# Patient Record
Sex: FEMALE | Race: WHITE | NOT HISPANIC OR LATINO
[De-identification: names, ages, dates, MRNs, and addresses within clinical notes are randomized per-mention and may not be internally consistent; named-entity substitution may affect disease eponyms.]

---

## 2017-01-27 ENCOUNTER — ASOB RESULT (OUTPATIENT)
Age: 27
End: 2017-01-27

## 2017-01-27 ENCOUNTER — APPOINTMENT (OUTPATIENT)
Dept: ANTEPARTUM | Facility: CLINIC | Age: 27
End: 2017-01-27

## 2017-06-22 ENCOUNTER — OUTPATIENT (OUTPATIENT)
Dept: OUTPATIENT SERVICES | Facility: HOSPITAL | Age: 27
LOS: 1 days | End: 2017-06-22
Payer: COMMERCIAL

## 2017-06-22 ENCOUNTER — ASOB RESULT (OUTPATIENT)
Age: 27
End: 2017-06-22

## 2017-06-22 ENCOUNTER — APPOINTMENT (OUTPATIENT)
Dept: ANTEPARTUM | Facility: CLINIC | Age: 27
End: 2017-06-22

## 2017-06-22 DIAGNOSIS — Z01.818 ENCOUNTER FOR OTHER PREPROCEDURAL EXAMINATION: ICD-10-CM

## 2017-06-22 PROCEDURE — 76818 FETAL BIOPHYS PROFILE W/NST: CPT

## 2017-06-22 PROCEDURE — 76816 OB US FOLLOW-UP PER FETUS: CPT

## 2017-06-23 DIAGNOSIS — O26.23 PREGNANCY CARE FOR PATIENT WITH RECURRENT PREGNANCY LOSS, THIRD TRIMESTER: ICD-10-CM

## 2017-06-23 DIAGNOSIS — O99.89 OTHER SPECIFIED DISEASES AND CONDITIONS COMPLICATING PREGNANCY, CHILDBIRTH AND THE PUERPERIUM: ICD-10-CM

## 2017-06-23 DIAGNOSIS — Z03.74 ENCOUNTER FOR SUSPECTED PROBLEM WITH FETAL GROWTH RULED OUT: ICD-10-CM

## 2017-06-23 DIAGNOSIS — O48.0 POST-TERM PREGNANCY: ICD-10-CM

## 2017-06-26 ENCOUNTER — APPOINTMENT (OUTPATIENT)
Dept: ANTEPARTUM | Facility: CLINIC | Age: 27
End: 2017-06-26

## 2017-06-26 ENCOUNTER — INPATIENT (INPATIENT)
Facility: HOSPITAL | Age: 27
LOS: 1 days | Discharge: HOME | End: 2017-06-28
Attending: OBSTETRICS & GYNECOLOGY | Admitting: OBSTETRICS & GYNECOLOGY
Payer: COMMERCIAL

## 2017-06-26 VITALS — WEIGHT: 160.94 LBS | HEIGHT: 69 IN

## 2017-06-26 DIAGNOSIS — Z34.80 ENCOUNTER FOR SUPERVISION OF OTHER NORMAL PREGNANCY, UNSPECIFIED TRIMESTER: ICD-10-CM

## 2017-06-26 DIAGNOSIS — O26.899 OTHER SPECIFIED PREGNANCY RELATED CONDITIONS, UNSPECIFIED TRIMESTER: ICD-10-CM

## 2017-06-26 DIAGNOSIS — Z3A.00 WEEKS OF GESTATION OF PREGNANCY NOT SPECIFIED: ICD-10-CM

## 2017-06-26 LAB
BASOPHILS # BLD AUTO: 0 K/UL — SIGNIFICANT CHANGE UP (ref 0–0.2)
BASOPHILS NFR BLD AUTO: 0.2 % — SIGNIFICANT CHANGE UP (ref 0–2)
BLD GP AB SCN SERPL QL: NEGATIVE — SIGNIFICANT CHANGE UP
EOSINOPHIL # BLD AUTO: 0.1 K/UL — SIGNIFICANT CHANGE UP (ref 0–0.5)
EOSINOPHIL NFR BLD AUTO: 0.8 % — SIGNIFICANT CHANGE UP (ref 0–6)
HCT VFR BLD CALC: 41 % — SIGNIFICANT CHANGE UP (ref 34.5–45)
HGB BLD-MCNC: 14.1 G/DL — SIGNIFICANT CHANGE UP (ref 11.5–15.5)
LYMPHOCYTES # BLD AUTO: 1.4 K/UL — SIGNIFICANT CHANGE UP (ref 1–3.3)
LYMPHOCYTES # BLD AUTO: 14.6 % — SIGNIFICANT CHANGE UP (ref 13–44)
MCHC RBC-ENTMCNC: 30.7 PG — SIGNIFICANT CHANGE UP (ref 27–34)
MCHC RBC-ENTMCNC: 34.4 GM/DL — SIGNIFICANT CHANGE UP (ref 32–36)
MCV RBC AUTO: 89 FL — SIGNIFICANT CHANGE UP (ref 80–100)
MONOCYTES # BLD AUTO: 0.5 K/UL — SIGNIFICANT CHANGE UP (ref 0–0.9)
MONOCYTES NFR BLD AUTO: 5.2 % — SIGNIFICANT CHANGE UP (ref 2–14)
NEUTROPHILS # BLD AUTO: 7.5 K/UL — HIGH (ref 1.8–7.4)
NEUTROPHILS NFR BLD AUTO: 79.1 % — HIGH (ref 43–77)
PLATELET # BLD AUTO: 189 K/UL — SIGNIFICANT CHANGE UP (ref 150–400)
RBC # BLD: 4.6 M/UL — SIGNIFICANT CHANGE UP (ref 3.8–5.2)
RBC # FLD: 12.1 % — SIGNIFICANT CHANGE UP (ref 10.3–14.5)
RH IG SCN BLD-IMP: POSITIVE — SIGNIFICANT CHANGE UP
WBC # BLD: 9.4 K/UL — SIGNIFICANT CHANGE UP (ref 3.8–10.5)
WBC # FLD AUTO: 9.4 K/UL — SIGNIFICANT CHANGE UP (ref 3.8–10.5)

## 2017-06-26 RX ORDER — DIPHENHYDRAMINE HCL 50 MG
25 CAPSULE ORAL EVERY 6 HOURS
Qty: 0 | Refills: 0 | Status: DISCONTINUED | OUTPATIENT
Start: 2017-06-26 | End: 2017-06-28

## 2017-06-26 RX ORDER — IBUPROFEN 200 MG
600 TABLET ORAL EVERY 6 HOURS
Qty: 0 | Refills: 0 | Status: COMPLETED | OUTPATIENT
Start: 2017-06-26 | End: 2018-05-25

## 2017-06-26 RX ORDER — PRAMOXINE HYDROCHLORIDE 150 MG/15G
1 AEROSOL, FOAM RECTAL EVERY 4 HOURS
Qty: 0 | Refills: 0 | Status: DISCONTINUED | OUTPATIENT
Start: 2017-06-26 | End: 2017-06-28

## 2017-06-26 RX ORDER — SODIUM CHLORIDE 9 MG/ML
1000 INJECTION, SOLUTION INTRAVENOUS ONCE
Qty: 0 | Refills: 0 | Status: COMPLETED | OUTPATIENT
Start: 2017-06-26 | End: 2017-06-26

## 2017-06-26 RX ORDER — DIBUCAINE 1 %
1 OINTMENT (GRAM) RECTAL EVERY 4 HOURS
Qty: 0 | Refills: 0 | Status: DISCONTINUED | OUTPATIENT
Start: 2017-06-26 | End: 2017-06-28

## 2017-06-26 RX ORDER — ACETAMINOPHEN 500 MG
975 TABLET ORAL EVERY 6 HOURS
Qty: 0 | Refills: 0 | Status: DISCONTINUED | OUTPATIENT
Start: 2017-06-26 | End: 2017-06-28

## 2017-06-26 RX ORDER — LANOLIN
1 OINTMENT (GRAM) TOPICAL EVERY 6 HOURS
Qty: 0 | Refills: 0 | Status: DISCONTINUED | OUTPATIENT
Start: 2017-06-26 | End: 2017-06-28

## 2017-06-26 RX ORDER — OXYCODONE HYDROCHLORIDE 5 MG/1
5 TABLET ORAL
Qty: 0 | Refills: 0 | Status: DISCONTINUED | OUTPATIENT
Start: 2017-06-26 | End: 2017-06-28

## 2017-06-26 RX ORDER — SODIUM CHLORIDE 9 MG/ML
1000 INJECTION, SOLUTION INTRAVENOUS
Qty: 0 | Refills: 0 | Status: DISCONTINUED | OUTPATIENT
Start: 2017-06-26 | End: 2017-06-26

## 2017-06-26 RX ORDER — KETOROLAC TROMETHAMINE 30 MG/ML
30 SYRINGE (ML) INJECTION ONCE
Qty: 0 | Refills: 0 | Status: DISCONTINUED | OUTPATIENT
Start: 2017-06-26 | End: 2017-06-26

## 2017-06-26 RX ORDER — IBUPROFEN 200 MG
600 TABLET ORAL EVERY 6 HOURS
Qty: 0 | Refills: 0 | Status: DISCONTINUED | OUTPATIENT
Start: 2017-06-26 | End: 2017-06-28

## 2017-06-26 RX ORDER — OXYCODONE HYDROCHLORIDE 5 MG/1
5 TABLET ORAL EVERY 4 HOURS
Qty: 0 | Refills: 0 | Status: DISCONTINUED | OUTPATIENT
Start: 2017-06-26 | End: 2017-06-28

## 2017-06-26 RX ORDER — TETANUS TOXOID, REDUCED DIPHTHERIA TOXOID AND ACELLULAR PERTUSSIS VACCINE, ADSORBED 5; 2.5; 8; 8; 2.5 [IU]/.5ML; [IU]/.5ML; UG/.5ML; UG/.5ML; UG/.5ML
0.5 SUSPENSION INTRAMUSCULAR ONCE
Qty: 0 | Refills: 0 | Status: DISCONTINUED | OUTPATIENT
Start: 2017-06-26 | End: 2017-06-28

## 2017-06-26 RX ORDER — OXYTOCIN 10 UNIT/ML
41.67 VIAL (ML) INJECTION
Qty: 20 | Refills: 0 | Status: DISCONTINUED | OUTPATIENT
Start: 2017-06-26 | End: 2017-06-28

## 2017-06-26 RX ORDER — SODIUM CHLORIDE 9 MG/ML
3 INJECTION INTRAMUSCULAR; INTRAVENOUS; SUBCUTANEOUS EVERY 8 HOURS
Qty: 0 | Refills: 0 | Status: DISCONTINUED | OUTPATIENT
Start: 2017-06-26 | End: 2017-06-26

## 2017-06-26 RX ORDER — DIBUCAINE 1 %
1 OINTMENT (GRAM) RECTAL EVERY 4 HOURS
Qty: 0 | Refills: 0 | Status: DISCONTINUED | OUTPATIENT
Start: 2017-06-26 | End: 2017-06-26

## 2017-06-26 RX ORDER — OXYTOCIN 10 UNIT/ML
41.67 VIAL (ML) INJECTION
Qty: 20 | Refills: 0 | Status: DISCONTINUED | OUTPATIENT
Start: 2017-06-26 | End: 2017-06-26

## 2017-06-26 RX ORDER — PRAMOXINE HYDROCHLORIDE 150 MG/15G
1 AEROSOL, FOAM RECTAL EVERY 4 HOURS
Qty: 0 | Refills: 0 | Status: DISCONTINUED | OUTPATIENT
Start: 2017-06-26 | End: 2017-06-26

## 2017-06-26 RX ORDER — SODIUM CHLORIDE 9 MG/ML
3 INJECTION INTRAMUSCULAR; INTRAVENOUS; SUBCUTANEOUS EVERY 8 HOURS
Qty: 0 | Refills: 0 | Status: DISCONTINUED | OUTPATIENT
Start: 2017-06-26 | End: 2017-06-28

## 2017-06-26 RX ORDER — HYDROCORTISONE 1 %
1 OINTMENT (GRAM) TOPICAL EVERY 4 HOURS
Qty: 0 | Refills: 0 | Status: DISCONTINUED | OUTPATIENT
Start: 2017-06-26 | End: 2017-06-28

## 2017-06-26 RX ORDER — OXYTOCIN 10 UNIT/ML
4 VIAL (ML) INJECTION
Qty: 30 | Refills: 0 | Status: DISCONTINUED | OUTPATIENT
Start: 2017-06-26 | End: 2017-06-26

## 2017-06-26 RX ORDER — CITRIC ACID/SODIUM CITRATE 300-500 MG
15 SOLUTION, ORAL ORAL EVERY 4 HOURS
Qty: 0 | Refills: 0 | Status: DISCONTINUED | OUTPATIENT
Start: 2017-06-26 | End: 2017-06-26

## 2017-06-26 RX ORDER — GLYCERIN ADULT
1 SUPPOSITORY, RECTAL RECTAL AT BEDTIME
Qty: 0 | Refills: 0 | Status: DISCONTINUED | OUTPATIENT
Start: 2017-06-26 | End: 2017-06-28

## 2017-06-26 RX ORDER — SIMETHICONE 80 MG/1
80 TABLET, CHEWABLE ORAL EVERY 6 HOURS
Qty: 0 | Refills: 0 | Status: DISCONTINUED | OUTPATIENT
Start: 2017-06-26 | End: 2017-06-28

## 2017-06-26 RX ORDER — AER TRAVELER 0.5 G/1
1 SOLUTION RECTAL; TOPICAL EVERY 4 HOURS
Qty: 0 | Refills: 0 | Status: DISCONTINUED | OUTPATIENT
Start: 2017-06-26 | End: 2017-06-28

## 2017-06-26 RX ORDER — MAGNESIUM HYDROXIDE 400 MG/1
30 TABLET, CHEWABLE ORAL
Qty: 0 | Refills: 0 | Status: DISCONTINUED | OUTPATIENT
Start: 2017-06-26 | End: 2017-06-28

## 2017-06-26 RX ORDER — OXYTOCIN 10 UNIT/ML
333.33 VIAL (ML) INJECTION
Qty: 20 | Refills: 0 | Status: DISCONTINUED | OUTPATIENT
Start: 2017-06-26 | End: 2017-06-26

## 2017-06-26 RX ORDER — DOCUSATE SODIUM 100 MG
100 CAPSULE ORAL
Qty: 0 | Refills: 0 | Status: DISCONTINUED | OUTPATIENT
Start: 2017-06-26 | End: 2017-06-28

## 2017-06-26 RX ORDER — AER TRAVELER 0.5 G/1
1 SOLUTION RECTAL; TOPICAL EVERY 4 HOURS
Qty: 0 | Refills: 0 | Status: DISCONTINUED | OUTPATIENT
Start: 2017-06-26 | End: 2017-06-26

## 2017-06-26 RX ORDER — HYDROCORTISONE 1 %
1 OINTMENT (GRAM) TOPICAL EVERY 4 HOURS
Qty: 0 | Refills: 0 | Status: DISCONTINUED | OUTPATIENT
Start: 2017-06-26 | End: 2017-06-26

## 2017-06-26 RX ORDER — ACETAMINOPHEN 500 MG
975 TABLET ORAL EVERY 6 HOURS
Qty: 0 | Refills: 0 | Status: COMPLETED | OUTPATIENT
Start: 2017-06-26 | End: 2018-05-25

## 2017-06-26 RX ADMIN — SODIUM CHLORIDE 2000 MILLILITER(S): 9 INJECTION, SOLUTION INTRAVENOUS at 16:23

## 2017-06-26 RX ADMIN — Medication 600 MILLIGRAM(S): at 22:03

## 2017-06-26 RX ADMIN — Medication 4 MILLIUNIT(S)/MIN: at 17:17

## 2017-06-26 RX ADMIN — Medication 600 MILLIGRAM(S): at 22:35

## 2017-06-27 LAB
HCT VFR BLD CALC: 30.9 % — LOW (ref 34.5–45)
HGB BLD-MCNC: 10.7 G/DL — LOW (ref 11.5–15.5)
T PALLIDUM AB TITR SER: NEGATIVE — SIGNIFICANT CHANGE UP

## 2017-06-27 RX ADMIN — Medication 975 MILLIGRAM(S): at 19:20

## 2017-06-27 RX ADMIN — Medication 975 MILLIGRAM(S): at 07:35

## 2017-06-27 RX ADMIN — Medication 975 MILLIGRAM(S): at 00:16

## 2017-06-27 RX ADMIN — Medication 600 MILLIGRAM(S): at 23:29

## 2017-06-27 RX ADMIN — Medication 600 MILLIGRAM(S): at 04:09

## 2017-06-27 RX ADMIN — Medication 975 MILLIGRAM(S): at 07:05

## 2017-06-27 RX ADMIN — Medication 600 MILLIGRAM(S): at 17:09

## 2017-06-27 RX ADMIN — Medication 975 MILLIGRAM(S): at 00:50

## 2017-06-27 RX ADMIN — Medication 600 MILLIGRAM(S): at 11:40

## 2017-06-27 RX ADMIN — Medication 975 MILLIGRAM(S): at 13:40

## 2017-06-27 RX ADMIN — Medication 600 MILLIGRAM(S): at 11:10

## 2017-06-27 RX ADMIN — Medication 975 MILLIGRAM(S): at 18:50

## 2017-06-27 RX ADMIN — Medication 600 MILLIGRAM(S): at 22:59

## 2017-06-27 RX ADMIN — Medication 600 MILLIGRAM(S): at 04:45

## 2017-06-27 RX ADMIN — Medication 100 MILLIGRAM(S): at 11:11

## 2017-06-27 RX ADMIN — Medication 600 MILLIGRAM(S): at 17:39

## 2017-06-27 RX ADMIN — Medication 975 MILLIGRAM(S): at 13:10

## 2017-06-27 RX ADMIN — Medication 1 TABLET(S): at 11:10

## 2017-06-27 NOTE — PROGRESS NOTE ADULT - SUBJECTIVE AND OBJECTIVE BOX
VS: Vital Signs Last 24 Hrs  T(C): 36.4 (27 Jun 2017 05:00), Max: 36.9 (26 Jun 2017 18:45)  T(F): 97.5 (27 Jun 2017 05:00), Max: 98.4 (26 Jun 2017 18:45)  HR: 62 (27 Jun 2017 05:00) (62 - 96)  BP: 96/64 (27 Jun 2017 05:00) (96/64 - 124/59)  BP(mean): --  RR: 18 (27 Jun 2017 05:00) (18 - 20)  SpO2: 97% (26 Jun 2017 21:30) (97% - 100%)    Abdomen soft, fundus firm  Lochia WNL  Voiding, stable

## 2017-06-28 VITALS
HEART RATE: 67 BPM | TEMPERATURE: 98 F | SYSTOLIC BLOOD PRESSURE: 95 MMHG | RESPIRATION RATE: 18 BRPM | DIASTOLIC BLOOD PRESSURE: 60 MMHG

## 2017-06-28 PROCEDURE — 59050 FETAL MONITOR W/REPORT: CPT

## 2017-06-28 PROCEDURE — 85018 HEMOGLOBIN: CPT

## 2017-06-28 PROCEDURE — 86901 BLOOD TYPING SEROLOGIC RH(D): CPT

## 2017-06-28 PROCEDURE — 86900 BLOOD TYPING SEROLOGIC ABO: CPT

## 2017-06-28 PROCEDURE — 85027 COMPLETE CBC AUTOMATED: CPT

## 2017-06-28 PROCEDURE — 86850 RBC ANTIBODY SCREEN: CPT

## 2017-06-28 PROCEDURE — G0463: CPT

## 2017-06-28 PROCEDURE — 59025 FETAL NON-STRESS TEST: CPT

## 2017-06-28 PROCEDURE — 86780 TREPONEMA PALLIDUM: CPT

## 2017-06-28 RX ADMIN — Medication 600 MILLIGRAM(S): at 11:44

## 2017-06-28 RX ADMIN — Medication 1 TABLET(S): at 10:56

## 2017-06-28 RX ADMIN — Medication 600 MILLIGRAM(S): at 05:21

## 2017-06-28 RX ADMIN — Medication 975 MILLIGRAM(S): at 08:13

## 2017-06-28 RX ADMIN — Medication 600 MILLIGRAM(S): at 10:56

## 2017-06-28 RX ADMIN — Medication 975 MILLIGRAM(S): at 02:44

## 2017-06-28 RX ADMIN — Medication 975 MILLIGRAM(S): at 02:14

## 2017-06-28 RX ADMIN — Medication 100 MILLIGRAM(S): at 10:56

## 2017-06-28 RX ADMIN — SIMETHICONE 80 MILLIGRAM(S): 80 TABLET, CHEWABLE ORAL at 10:58

## 2017-06-28 RX ADMIN — Medication 975 MILLIGRAM(S): at 08:54

## 2017-06-28 NOTE — DISCHARGE NOTE OB - PATIENT PORTAL LINK FT
“You can access the [facility] Patient Portal, offered by [facility], by registering with the following website: [email or web address]”

## 2017-06-28 NOTE — PROGRESS NOTE ADULT - SUBJECTIVE AND OBJECTIVE BOX
VS: Vital Signs Last 24 Hrs  T(C): 36.4 (28 Jun 2017 05:29), Max: 36.8 (27 Jun 2017 21:00)  T(F): 97.6 (28 Jun 2017 05:29), Max: 98.2 (27 Jun 2017 21:00)  HR: 67 (28 Jun 2017 05:29) (67 - 86)  BP: 95/60 (28 Jun 2017 05:29) (95/60 - 97/63)  BP(mean): --  RR: 18 (28 Jun 2017 05:29) (18 - 18)  SpO2: --    Abdomen soft, fundus firm  Lochia WNL  Voiding, stable

## 2017-06-28 NOTE — DISCHARGE NOTE OB - CARE PROVIDER_API CALL
Conchis Crowley), Obstetrics and Gynecology  [address]  [place] [address]  Phone: [phone]  Fax: [phone]

## 2018-04-30 ENCOUNTER — APPOINTMENT (OUTPATIENT)
Dept: ANTEPARTUM | Facility: CLINIC | Age: 28
End: 2018-04-30
Payer: MEDICAID

## 2018-04-30 ENCOUNTER — ASOB RESULT (OUTPATIENT)
Age: 28
End: 2018-04-30

## 2018-04-30 PROCEDURE — 76801 OB US < 14 WKS SINGLE FETUS: CPT

## 2018-12-03 ENCOUNTER — INPATIENT (INPATIENT)
Facility: HOSPITAL | Age: 28
LOS: 2 days | Discharge: HOME | End: 2018-12-06
Attending: SPECIALIST | Admitting: SPECIALIST

## 2018-12-03 ENCOUNTER — OUTPATIENT (OUTPATIENT)
Dept: OUTPATIENT SERVICES | Facility: HOSPITAL | Age: 28
LOS: 1 days | End: 2018-12-03

## 2018-12-03 VITALS — WEIGHT: 160.94 LBS | HEIGHT: 68 IN

## 2018-12-03 DIAGNOSIS — O26.899 OTHER SPECIFIED PREGNANCY RELATED CONDITIONS, UNSPECIFIED TRIMESTER: ICD-10-CM

## 2018-12-03 DIAGNOSIS — Z3A.00 WEEKS OF GESTATION OF PREGNANCY NOT SPECIFIED: ICD-10-CM

## 2018-12-03 DIAGNOSIS — O48.0 POST-TERM PREGNANCY: ICD-10-CM

## 2018-12-03 LAB
APPEARANCE UR: CLEAR — SIGNIFICANT CHANGE UP
BASOPHILS # BLD AUTO: 0.02 K/UL — SIGNIFICANT CHANGE UP (ref 0–0.2)
BASOPHILS NFR BLD AUTO: 0.2 % — SIGNIFICANT CHANGE UP (ref 0–2)
BILIRUB UR-MCNC: NEGATIVE — SIGNIFICANT CHANGE UP
BLD GP AB SCN SERPL QL: NEGATIVE — SIGNIFICANT CHANGE UP
BLOOD UR QL VISUAL: NEGATIVE — SIGNIFICANT CHANGE UP
COLOR SPEC: COLORLESS — SIGNIFICANT CHANGE UP
EOSINOPHIL # BLD AUTO: 0.2 K/UL — SIGNIFICANT CHANGE UP (ref 0–0.5)
EOSINOPHIL NFR BLD AUTO: 2.2 % — SIGNIFICANT CHANGE UP (ref 0–6)
GLUCOSE UR-MCNC: NEGATIVE — SIGNIFICANT CHANGE UP
HCT VFR BLD CALC: 35.6 % — SIGNIFICANT CHANGE UP (ref 34.5–45)
HCT VFR BLD CALC: 36.6 % — SIGNIFICANT CHANGE UP (ref 34.5–45)
HGB BLD-MCNC: 11.8 G/DL — SIGNIFICANT CHANGE UP (ref 11.5–15.5)
HGB BLD-MCNC: 12.3 G/DL — SIGNIFICANT CHANGE UP (ref 11.5–15.5)
IMM GRANULOCYTES # BLD AUTO: 0.04 # — SIGNIFICANT CHANGE UP
IMM GRANULOCYTES NFR BLD AUTO: 0.4 % — SIGNIFICANT CHANGE UP (ref 0–1.5)
KETONES UR-MCNC: NEGATIVE — SIGNIFICANT CHANGE UP
LEUKOCYTE ESTERASE UR-ACNC: NEGATIVE — SIGNIFICANT CHANGE UP
LYMPHOCYTES # BLD AUTO: 1.59 K/UL — SIGNIFICANT CHANGE UP (ref 1–3.3)
LYMPHOCYTES # BLD AUTO: 17.4 % — SIGNIFICANT CHANGE UP (ref 13–44)
MCHC RBC-ENTMCNC: 29.5 PG — SIGNIFICANT CHANGE UP (ref 27–34)
MCHC RBC-ENTMCNC: 30 PG — SIGNIFICANT CHANGE UP (ref 27–34)
MCHC RBC-ENTMCNC: 33.1 % — SIGNIFICANT CHANGE UP (ref 32–36)
MCHC RBC-ENTMCNC: 33.6 % — SIGNIFICANT CHANGE UP (ref 32–36)
MCV RBC AUTO: 89 FL — SIGNIFICANT CHANGE UP (ref 80–100)
MCV RBC AUTO: 89.3 FL — SIGNIFICANT CHANGE UP (ref 80–100)
MONOCYTES # BLD AUTO: 0.53 K/UL — SIGNIFICANT CHANGE UP (ref 0–0.9)
MONOCYTES NFR BLD AUTO: 5.8 % — SIGNIFICANT CHANGE UP (ref 2–14)
NEUTROPHILS # BLD AUTO: 6.78 K/UL — SIGNIFICANT CHANGE UP (ref 1.8–7.4)
NEUTROPHILS NFR BLD AUTO: 74 % — SIGNIFICANT CHANGE UP (ref 43–77)
NITRITE UR-MCNC: NEGATIVE — SIGNIFICANT CHANGE UP
NRBC # FLD: 0 — SIGNIFICANT CHANGE UP
NRBC # FLD: 0 — SIGNIFICANT CHANGE UP
PH UR: 7 — SIGNIFICANT CHANGE UP (ref 5–8)
PLATELET # BLD AUTO: 159 K/UL — SIGNIFICANT CHANGE UP (ref 150–400)
PLATELET # BLD AUTO: 165 K/UL — SIGNIFICANT CHANGE UP (ref 150–400)
PMV BLD: 11 FL — SIGNIFICANT CHANGE UP (ref 7–13)
PMV BLD: 11.4 FL — SIGNIFICANT CHANGE UP (ref 7–13)
PROT UR-MCNC: NEGATIVE — SIGNIFICANT CHANGE UP
RBC # BLD: 4 M/UL — SIGNIFICANT CHANGE UP (ref 3.8–5.2)
RBC # BLD: 4.1 M/UL — SIGNIFICANT CHANGE UP (ref 3.8–5.2)
RBC # FLD: 13 % — SIGNIFICANT CHANGE UP (ref 10.3–14.5)
RBC # FLD: 13 % — SIGNIFICANT CHANGE UP (ref 10.3–14.5)
RH IG SCN BLD-IMP: POSITIVE — SIGNIFICANT CHANGE UP
SP GR SPEC: 1 — SIGNIFICANT CHANGE UP (ref 1–1.04)
UROBILINOGEN FLD QL: NORMAL — SIGNIFICANT CHANGE UP
WBC # BLD: 8.68 K/UL — SIGNIFICANT CHANGE UP (ref 3.8–10.5)
WBC # BLD: 9.16 K/UL — SIGNIFICANT CHANGE UP (ref 3.8–10.5)
WBC # FLD AUTO: 8.68 K/UL — SIGNIFICANT CHANGE UP (ref 3.8–10.5)
WBC # FLD AUTO: 9.16 K/UL — SIGNIFICANT CHANGE UP (ref 3.8–10.5)

## 2018-12-03 RX ORDER — OXYTOCIN 10 UNIT/ML
333.33 VIAL (ML) INJECTION
Qty: 20 | Refills: 0 | Status: DISCONTINUED | OUTPATIENT
Start: 2018-12-03 | End: 2018-12-04

## 2018-12-03 RX ORDER — SODIUM CHLORIDE 9 MG/ML
1000 INJECTION, SOLUTION INTRAVENOUS
Qty: 0 | Refills: 0 | Status: DISCONTINUED | OUTPATIENT
Start: 2018-12-03 | End: 2018-12-04

## 2018-12-03 RX ORDER — SODIUM CHLORIDE 9 MG/ML
1000 INJECTION, SOLUTION INTRAVENOUS ONCE
Qty: 0 | Refills: 0 | Status: DISCONTINUED | OUTPATIENT
Start: 2018-12-03 | End: 2018-12-04

## 2018-12-04 ENCOUNTER — TRANSCRIPTION ENCOUNTER (OUTPATIENT)
Age: 28
End: 2018-12-04

## 2018-12-04 LAB
HCT VFR BLD CALC: 11.2 % — CRITICAL LOW (ref 34.5–45)
HCT VFR BLD CALC: 27.4 % — LOW (ref 34.5–45)
HGB BLD-MCNC: 3.6 G/DL — CRITICAL LOW (ref 11.5–15.5)
HGB BLD-MCNC: 9.4 G/DL — LOW (ref 11.5–15.5)
MCHC RBC-ENTMCNC: 30.3 PG — SIGNIFICANT CHANGE UP (ref 27–34)
MCHC RBC-ENTMCNC: 30.5 PG — SIGNIFICANT CHANGE UP (ref 27–34)
MCHC RBC-ENTMCNC: 32.1 % — SIGNIFICANT CHANGE UP (ref 32–36)
MCHC RBC-ENTMCNC: 34.3 % — SIGNIFICANT CHANGE UP (ref 32–36)
MCV RBC AUTO: 89 FL — SIGNIFICANT CHANGE UP (ref 80–100)
MCV RBC AUTO: 94.1 FL — SIGNIFICANT CHANGE UP (ref 80–100)
NRBC # FLD: 0 — SIGNIFICANT CHANGE UP
NRBC # FLD: 0 — SIGNIFICANT CHANGE UP
PLATELET # BLD AUTO: 138 K/UL — LOW (ref 150–400)
PLATELET # BLD AUTO: 63 K/UL — LOW (ref 150–400)
PMV BLD: 11.2 FL — SIGNIFICANT CHANGE UP (ref 7–13)
PMV BLD: 11.3 FL — SIGNIFICANT CHANGE UP (ref 7–13)
RBC # BLD: 1.19 M/UL — LOW (ref 3.8–5.2)
RBC # BLD: 3.08 M/UL — LOW (ref 3.8–5.2)
RBC # FLD: 13.1 % — SIGNIFICANT CHANGE UP (ref 10.3–14.5)
RBC # FLD: 13.2 % — SIGNIFICANT CHANGE UP (ref 10.3–14.5)
RH IG SCN BLD-IMP: POSITIVE — SIGNIFICANT CHANGE UP
T PALLIDUM AB TITR SER: NEGATIVE — SIGNIFICANT CHANGE UP
T PALLIDUM AB TITR SER: NEGATIVE — SIGNIFICANT CHANGE UP
WBC # BLD: 14.34 K/UL — HIGH (ref 3.8–10.5)
WBC # BLD: 4.54 K/UL — SIGNIFICANT CHANGE UP (ref 3.8–10.5)
WBC # FLD AUTO: 14.34 K/UL — HIGH (ref 3.8–10.5)
WBC # FLD AUTO: 4.54 K/UL — SIGNIFICANT CHANGE UP (ref 3.8–10.5)

## 2018-12-04 RX ORDER — ACETAMINOPHEN 500 MG
975 TABLET ORAL EVERY 6 HOURS
Qty: 0 | Refills: 0 | Status: DISCONTINUED | OUTPATIENT
Start: 2018-12-04 | End: 2018-12-06

## 2018-12-04 RX ORDER — HYDROCORTISONE 1 %
1 OINTMENT (GRAM) TOPICAL EVERY 4 HOURS
Qty: 0 | Refills: 0 | Status: DISCONTINUED | OUTPATIENT
Start: 2018-12-04 | End: 2018-12-04

## 2018-12-04 RX ORDER — SIMETHICONE 80 MG/1
80 TABLET, CHEWABLE ORAL EVERY 6 HOURS
Qty: 0 | Refills: 0 | Status: DISCONTINUED | OUTPATIENT
Start: 2018-12-04 | End: 2018-12-06

## 2018-12-04 RX ORDER — DIPHENHYDRAMINE HCL 50 MG
25 CAPSULE ORAL EVERY 6 HOURS
Qty: 0 | Refills: 0 | Status: DISCONTINUED | OUTPATIENT
Start: 2018-12-04 | End: 2018-12-06

## 2018-12-04 RX ORDER — DIBUCAINE 1 %
1 OINTMENT (GRAM) RECTAL EVERY 4 HOURS
Qty: 0 | Refills: 0 | Status: DISCONTINUED | OUTPATIENT
Start: 2018-12-04 | End: 2018-12-04

## 2018-12-04 RX ORDER — TETANUS TOXOID, REDUCED DIPHTHERIA TOXOID AND ACELLULAR PERTUSSIS VACCINE, ADSORBED 5; 2.5; 8; 8; 2.5 [IU]/.5ML; [IU]/.5ML; UG/.5ML; UG/.5ML; UG/.5ML
0.5 SUSPENSION INTRAMUSCULAR ONCE
Qty: 0 | Refills: 0 | Status: DISCONTINUED | OUTPATIENT
Start: 2018-12-04 | End: 2018-12-06

## 2018-12-04 RX ORDER — ACETAMINOPHEN 500 MG
975 TABLET ORAL EVERY 6 HOURS
Qty: 0 | Refills: 0 | Status: COMPLETED | OUTPATIENT
Start: 2018-12-04 | End: 2019-11-02

## 2018-12-04 RX ORDER — GLYCERIN ADULT
1 SUPPOSITORY, RECTAL RECTAL AT BEDTIME
Qty: 0 | Refills: 0 | Status: DISCONTINUED | OUTPATIENT
Start: 2018-12-04 | End: 2018-12-06

## 2018-12-04 RX ORDER — PRAMOXINE HYDROCHLORIDE 150 MG/15G
1 AEROSOL, FOAM RECTAL EVERY 4 HOURS
Qty: 0 | Refills: 0 | Status: DISCONTINUED | OUTPATIENT
Start: 2018-12-04 | End: 2018-12-06

## 2018-12-04 RX ORDER — FAMOTIDINE 10 MG/ML
20 INJECTION INTRAVENOUS ONCE
Qty: 0 | Refills: 0 | Status: COMPLETED | OUTPATIENT
Start: 2018-12-04 | End: 2018-12-04

## 2018-12-04 RX ORDER — OXYCODONE HYDROCHLORIDE 5 MG/1
5 TABLET ORAL EVERY 4 HOURS
Qty: 0 | Refills: 0 | Status: DISCONTINUED | OUTPATIENT
Start: 2018-12-04 | End: 2018-12-06

## 2018-12-04 RX ORDER — OXYTOCIN 10 UNIT/ML
41.67 VIAL (ML) INJECTION
Qty: 20 | Refills: 0 | Status: DISCONTINUED | OUTPATIENT
Start: 2018-12-04 | End: 2018-12-04

## 2018-12-04 RX ORDER — DOCUSATE SODIUM 100 MG
100 CAPSULE ORAL
Qty: 0 | Refills: 0 | Status: DISCONTINUED | OUTPATIENT
Start: 2018-12-04 | End: 2018-12-05

## 2018-12-04 RX ORDER — HYDROCORTISONE 1 %
1 OINTMENT (GRAM) TOPICAL EVERY 4 HOURS
Qty: 0 | Refills: 0 | Status: DISCONTINUED | OUTPATIENT
Start: 2018-12-04 | End: 2018-12-06

## 2018-12-04 RX ORDER — AER TRAVELER 0.5 G/1
1 SOLUTION RECTAL; TOPICAL EVERY 4 HOURS
Qty: 0 | Refills: 0 | Status: DISCONTINUED | OUTPATIENT
Start: 2018-12-04 | End: 2018-12-04

## 2018-12-04 RX ORDER — OXYTOCIN 10 UNIT/ML
1 VIAL (ML) INJECTION
Qty: 30 | Refills: 0 | Status: DISCONTINUED | OUTPATIENT
Start: 2018-12-04 | End: 2018-12-04

## 2018-12-04 RX ORDER — IBUPROFEN 200 MG
600 TABLET ORAL EVERY 6 HOURS
Qty: 0 | Refills: 0 | Status: DISCONTINUED | OUTPATIENT
Start: 2018-12-04 | End: 2018-12-06

## 2018-12-04 RX ORDER — DIBUCAINE 1 %
1 OINTMENT (GRAM) RECTAL EVERY 4 HOURS
Qty: 0 | Refills: 0 | Status: DISCONTINUED | OUTPATIENT
Start: 2018-12-04 | End: 2018-12-06

## 2018-12-04 RX ORDER — AER TRAVELER 0.5 G/1
1 SOLUTION RECTAL; TOPICAL EVERY 4 HOURS
Qty: 0 | Refills: 0 | Status: DISCONTINUED | OUTPATIENT
Start: 2018-12-04 | End: 2018-12-06

## 2018-12-04 RX ORDER — MAGNESIUM HYDROXIDE 400 MG/1
30 TABLET, CHEWABLE ORAL
Qty: 0 | Refills: 0 | Status: DISCONTINUED | OUTPATIENT
Start: 2018-12-04 | End: 2018-12-06

## 2018-12-04 RX ORDER — KETOROLAC TROMETHAMINE 30 MG/ML
30 SYRINGE (ML) INJECTION ONCE
Qty: 0 | Refills: 0 | Status: DISCONTINUED | OUTPATIENT
Start: 2018-12-04 | End: 2018-12-04

## 2018-12-04 RX ORDER — SODIUM CHLORIDE 9 MG/ML
3 INJECTION INTRAMUSCULAR; INTRAVENOUS; SUBCUTANEOUS EVERY 8 HOURS
Qty: 0 | Refills: 0 | Status: DISCONTINUED | OUTPATIENT
Start: 2018-12-04 | End: 2018-12-04

## 2018-12-04 RX ORDER — SODIUM CHLORIDE 9 MG/ML
3 INJECTION INTRAMUSCULAR; INTRAVENOUS; SUBCUTANEOUS EVERY 8 HOURS
Qty: 0 | Refills: 0 | Status: DISCONTINUED | OUTPATIENT
Start: 2018-12-04 | End: 2018-12-06

## 2018-12-04 RX ORDER — LANOLIN
1 OINTMENT (GRAM) TOPICAL EVERY 6 HOURS
Qty: 0 | Refills: 0 | Status: DISCONTINUED | OUTPATIENT
Start: 2018-12-04 | End: 2018-12-06

## 2018-12-04 RX ORDER — CEFAZOLIN SODIUM 1 G
2000 VIAL (EA) INJECTION ONCE
Qty: 0 | Refills: 0 | Status: COMPLETED | OUTPATIENT
Start: 2018-12-04 | End: 2018-12-04

## 2018-12-04 RX ORDER — IBUPROFEN 200 MG
600 TABLET ORAL EVERY 6 HOURS
Qty: 0 | Refills: 0 | Status: COMPLETED | OUTPATIENT
Start: 2018-12-04 | End: 2019-11-02

## 2018-12-04 RX ORDER — PRAMOXINE HYDROCHLORIDE 150 MG/15G
1 AEROSOL, FOAM RECTAL EVERY 4 HOURS
Qty: 0 | Refills: 0 | Status: DISCONTINUED | OUTPATIENT
Start: 2018-12-04 | End: 2018-12-04

## 2018-12-04 RX ORDER — OXYCODONE HYDROCHLORIDE 5 MG/1
5 TABLET ORAL
Qty: 0 | Refills: 0 | Status: DISCONTINUED | OUTPATIENT
Start: 2018-12-04 | End: 2018-12-06

## 2018-12-04 RX ADMIN — FAMOTIDINE 20 MILLIGRAM(S): 10 INJECTION INTRAVENOUS at 10:36

## 2018-12-04 RX ADMIN — Medication 1 MILLIUNIT(S)/MIN: at 03:54

## 2018-12-04 RX ADMIN — Medication 600 MILLIGRAM(S): at 21:45

## 2018-12-04 RX ADMIN — Medication 975 MILLIGRAM(S): at 18:45

## 2018-12-04 RX ADMIN — Medication 975 MILLIGRAM(S): at 19:30

## 2018-12-04 RX ADMIN — Medication 100 MILLIGRAM(S): at 13:27

## 2018-12-04 RX ADMIN — Medication 30 MILLIGRAM(S): at 13:30

## 2018-12-04 RX ADMIN — Medication 600 MILLIGRAM(S): at 21:15

## 2018-12-04 NOTE — CHART NOTE - NSCHARTNOTEFT_GEN_A_CORE
Patient s/p  , PPH for manual extraction of placenta.  Patient evaluated at bedside due to orthostatics positive by HR.  Sitting to Standing 74-.  Patient stable.  Denies dizziness, weakness, SOB, CP, or palpitations.  Patient ambulating and voiding without difficulty.  Encouraged patient to increase hydration.  Repeat CBC to be done in the AM or earlier if patient becomes symptomatic.      PE:    General: appears well, sitting in bed.  Abdomen: fundus firm, non-distended.  Vaginal: lochia WNL  Extremities: non-tender bilaterally     Plan:  Encouraged p.o. hydration  Repeat CBC in AM Patient s/p  , PPH for manual extraction of placenta.  Patient evaluated at bedside due to orthostatics positive by HR.  Sitting to Standing 74-.  Patient stable.  Denies dizziness, weakness, SOB, CP, or palpitations.  Patient ambulating and voiding without difficulty.  Encouraged patient to increase hydration.  Repeat CBC to be done in the AM or earlier if patient becomes symptomatic.                                       9.4    14.34 )-----------( 138      ( 04 Dec 2018 12:00 )             27.4                         11.8   9.16  )-----------( 159      ( 03 Dec 2018 23:05 )             35.6                         12.3   8.68  )-----------( 165      ( 03 Dec 2018 09:10 )             36.6     PE:    General: appears well, sitting in bed.  Abdomen: fundus firm, non-distended.  Vaginal: lochia WNL  Extremities: non-tender bilaterally     Plan:  Encouraged p.o. hydration  Repeat CBC in AM

## 2018-12-04 NOTE — DISCHARGE NOTE OB - PATIENT PORTAL LINK FT
You can access the [facility] Patient Portal, offered by [facility], by registering with the following website: [email or web address]

## 2018-12-04 NOTE — DISCHARGE NOTE OB - CARE PROVIDER_API CALL
Jose Aguilera (MD), Obstetrics and Gynecology  [address]  [address] [place] [address]  Phone: [phone]  Fax: [phone]

## 2018-12-05 LAB
HCT VFR BLD CALC: 24.9 % — LOW (ref 34.5–45)
HCT VFR BLD CALC: 26.9 % — LOW (ref 34.5–45)
HGB BLD-MCNC: 8.3 G/DL — LOW (ref 11.5–15.5)
HGB BLD-MCNC: 9 G/DL — LOW (ref 11.5–15.5)
MCHC RBC-ENTMCNC: 30.3 PG — SIGNIFICANT CHANGE UP (ref 27–34)
MCHC RBC-ENTMCNC: 30.4 PG — SIGNIFICANT CHANGE UP (ref 27–34)
MCHC RBC-ENTMCNC: 33.3 % — SIGNIFICANT CHANGE UP (ref 32–36)
MCHC RBC-ENTMCNC: 33.5 % — SIGNIFICANT CHANGE UP (ref 32–36)
MCV RBC AUTO: 90.9 FL — SIGNIFICANT CHANGE UP (ref 80–100)
MCV RBC AUTO: 90.9 FL — SIGNIFICANT CHANGE UP (ref 80–100)
NRBC # FLD: 0 — SIGNIFICANT CHANGE UP
NRBC # FLD: 0 — SIGNIFICANT CHANGE UP
PLATELET # BLD AUTO: 124 K/UL — LOW (ref 150–400)
PLATELET # BLD AUTO: 155 K/UL — SIGNIFICANT CHANGE UP (ref 150–400)
PMV BLD: 11 FL — SIGNIFICANT CHANGE UP (ref 7–13)
PMV BLD: 11.6 FL — SIGNIFICANT CHANGE UP (ref 7–13)
RBC # BLD: 2.74 M/UL — LOW (ref 3.8–5.2)
RBC # BLD: 2.96 M/UL — LOW (ref 3.8–5.2)
RBC # FLD: 13.4 % — SIGNIFICANT CHANGE UP (ref 10.3–14.5)
RBC # FLD: 13.5 % — SIGNIFICANT CHANGE UP (ref 10.3–14.5)
WBC # BLD: 9.1 K/UL — SIGNIFICANT CHANGE UP (ref 3.8–10.5)
WBC # BLD: 9.6 K/UL — SIGNIFICANT CHANGE UP (ref 3.8–10.5)
WBC # FLD AUTO: 9.1 K/UL — SIGNIFICANT CHANGE UP (ref 3.8–10.5)
WBC # FLD AUTO: 9.6 K/UL — SIGNIFICANT CHANGE UP (ref 3.8–10.5)

## 2018-12-05 RX ORDER — FERROUS SULFATE 325(65) MG
325 TABLET ORAL THREE TIMES A DAY
Qty: 0 | Refills: 0 | Status: DISCONTINUED | OUTPATIENT
Start: 2018-12-05 | End: 2018-12-06

## 2018-12-05 RX ORDER — ASCORBIC ACID 60 MG
500 TABLET,CHEWABLE ORAL DAILY
Qty: 0 | Refills: 0 | Status: DISCONTINUED | OUTPATIENT
Start: 2018-12-05 | End: 2018-12-06

## 2018-12-05 RX ORDER — DOCUSATE SODIUM 100 MG
100 CAPSULE ORAL
Qty: 0 | Refills: 0 | Status: DISCONTINUED | OUTPATIENT
Start: 2018-12-05 | End: 2018-12-06

## 2018-12-05 RX ADMIN — Medication 975 MILLIGRAM(S): at 23:20

## 2018-12-05 RX ADMIN — Medication 600 MILLIGRAM(S): at 03:20

## 2018-12-05 RX ADMIN — Medication 325 MILLIGRAM(S): at 22:49

## 2018-12-05 RX ADMIN — Medication 975 MILLIGRAM(S): at 16:35

## 2018-12-05 RX ADMIN — Medication 975 MILLIGRAM(S): at 11:01

## 2018-12-05 RX ADMIN — Medication 500 MILLIGRAM(S): at 16:34

## 2018-12-05 RX ADMIN — Medication 975 MILLIGRAM(S): at 01:00

## 2018-12-05 RX ADMIN — Medication 600 MILLIGRAM(S): at 17:05

## 2018-12-05 RX ADMIN — Medication 1 TABLET(S): at 16:35

## 2018-12-05 RX ADMIN — Medication 600 MILLIGRAM(S): at 03:50

## 2018-12-05 RX ADMIN — Medication 975 MILLIGRAM(S): at 00:30

## 2018-12-05 RX ADMIN — Medication 100 MILLIGRAM(S): at 10:32

## 2018-12-05 RX ADMIN — Medication 600 MILLIGRAM(S): at 23:20

## 2018-12-05 RX ADMIN — Medication 600 MILLIGRAM(S): at 16:35

## 2018-12-05 RX ADMIN — Medication 975 MILLIGRAM(S): at 22:48

## 2018-12-05 RX ADMIN — Medication 975 MILLIGRAM(S): at 10:31

## 2018-12-05 RX ADMIN — Medication 975 MILLIGRAM(S): at 17:05

## 2018-12-05 RX ADMIN — Medication 600 MILLIGRAM(S): at 10:31

## 2018-12-05 RX ADMIN — Medication 600 MILLIGRAM(S): at 11:01

## 2018-12-05 RX ADMIN — Medication 100 MILLIGRAM(S): at 16:35

## 2018-12-05 RX ADMIN — SODIUM CHLORIDE 3 MILLILITER(S): 9 INJECTION INTRAMUSCULAR; INTRAVENOUS; SUBCUTANEOUS at 06:00

## 2018-12-05 RX ADMIN — Medication 325 MILLIGRAM(S): at 16:35

## 2018-12-05 RX ADMIN — Medication 600 MILLIGRAM(S): at 22:49

## 2018-12-05 NOTE — PROGRESS NOTE ADULT - SUBJECTIVE AND OBJECTIVE BOX
OB Progress Note:  PPD#1    S: 29yo  PPD#1 s/p . Patient feels well but tired. Pain is well controlled. She is tolerating a regular diet and passing flatus. She is voiding spontaneously, and ambulating. Denies CP/SOB. Denies lightheadedness/dizziness. Denies N/V. Denies headaches.    O:  Vitals:  Vital Signs Last 24 Hrs  T(C): 36.7 (05 Dec 2018 05:23), Max: 36.9 (04 Dec 2018 17:00)  T(F): 98.1 (05 Dec 2018 05:23), Max: 98.5 (04 Dec 2018 17:00)  HR: 64 (05 Dec 2018 05:23) (64 - 106)  BP: 90/60 (05 Dec 2018 06:03) (83/44 - 125/56)  BP(mean): --  RR: 17 (05 Dec 2018 05:23) (16 - 18)  SpO2: 100% (05 Dec 2018 05:23) (91% - 100%)    MEDICATIONS  (STANDING):  acetaminophen   Tablet .. 975 milliGRAM(s) Oral every 6 hours  diphtheria/tetanus/pertussis (acellular) Vaccine (ADAcel) 0.5 milliLiter(s) IntraMuscular once  ibuprofen  Tablet. 600 milliGRAM(s) Oral every 6 hours  oxyCODONE    IR 5 milliGRAM(s) Oral every 3 hours  prenatal multivitamin 1 Tablet(s) Oral daily  sodium chloride 0.9% lock flush 3 milliLiter(s) IV Push every 8 hours      Labs:  Blood type: A Positive  Rubella IgG: RPR: Negative                          8.3<L>   9.10 >-----------< 124<L>    (  @ 06:00 )             24.9<L>                        9.4<L>   14.34<H> >-----------< 138<L>    (  @ 12:00 )             27.4<L>                        3.6<LL>   4.54 >-----------< 63<L>    (  @ 11:57 )             11.2<LL>                        11.8   9.16 >-----------< 159    (  @ 23:05 )             35.6                        12.3   8.68 >-----------< 165    (  @ 09:10 )             36.6                  Physical Exam:  General: NAD  Abdomen: soft, non-tender, non-distended, fundus firm  Vaginal: Lochia wnl  Extremities: No erythema/edema

## 2018-12-05 NOTE — PROGRESS NOTE ADULT - ASSESSMENT
A/P: 29yo PPD#1 s/p  c/b EBL 1000 2/2 manual extraction of placenta. VSS. No signs of sxs of severe anemia at this time. Orthostatics overnight were positive, but patient reports feeling improved this am.

## 2018-12-05 NOTE — PROGRESS NOTE ADULT - PROBLEM SELECTOR PLAN 1
- [facility] [place]  - +orthostatics by HR overnight; reassess based on [facility]  - Encouraged PO intake  - Pain well controlled, continue current pain regimen  - Increase ambulation, SCDs when not ambulating  - Continue regular diet    Connie Lazo PGY1

## 2018-12-06 VITALS
HEART RATE: 65 BPM | DIASTOLIC BLOOD PRESSURE: 54 MMHG | TEMPERATURE: 98 F | OXYGEN SATURATION: 99 % | RESPIRATION RATE: 18 BRPM | SYSTOLIC BLOOD PRESSURE: 90 MMHG

## 2018-12-06 LAB
HCT VFR BLD CALC: 25.4 % — LOW (ref 34.5–45)
HGB BLD-MCNC: 8.3 G/DL — LOW (ref 11.5–15.5)
MCHC RBC-ENTMCNC: 30 PG — SIGNIFICANT CHANGE UP (ref 27–34)
MCHC RBC-ENTMCNC: 32.7 % — SIGNIFICANT CHANGE UP (ref 32–36)
MCV RBC AUTO: 91.7 FL — SIGNIFICANT CHANGE UP (ref 80–100)
NRBC # FLD: 0 — SIGNIFICANT CHANGE UP
PLATELET # BLD AUTO: 133 K/UL — LOW (ref 150–400)
PMV BLD: 11.2 FL — SIGNIFICANT CHANGE UP (ref 7–13)
RBC # BLD: 2.77 M/UL — LOW (ref 3.8–5.2)
RBC # FLD: 13.5 % — SIGNIFICANT CHANGE UP (ref 10.3–14.5)
WBC # BLD: 7.66 K/UL — SIGNIFICANT CHANGE UP (ref 3.8–10.5)
WBC # FLD AUTO: 7.66 K/UL — SIGNIFICANT CHANGE UP (ref 3.8–10.5)

## 2018-12-06 RX ORDER — IBUPROFEN 200 MG
1 TABLET ORAL
Qty: 0 | Refills: 0 | COMMUNITY
Start: 2018-12-06

## 2018-12-06 RX ORDER — ACETAMINOPHEN 500 MG
3 TABLET ORAL
Qty: 0 | Refills: 0 | COMMUNITY
Start: 2018-12-06

## 2018-12-06 RX ADMIN — Medication 600 MILLIGRAM(S): at 10:43

## 2018-12-06 RX ADMIN — Medication 600 MILLIGRAM(S): at 05:00

## 2018-12-06 RX ADMIN — Medication 325 MILLIGRAM(S): at 10:35

## 2018-12-06 RX ADMIN — Medication 600 MILLIGRAM(S): at 03:59

## 2018-12-06 RX ADMIN — Medication 975 MILLIGRAM(S): at 04:02

## 2018-12-06 RX ADMIN — Medication 975 MILLIGRAM(S): at 05:00

## 2018-12-06 RX ADMIN — Medication 100 MILLIGRAM(S): at 06:03

## 2018-12-06 RX ADMIN — Medication 500 MILLIGRAM(S): at 10:42

## 2018-12-06 RX ADMIN — Medication 975 MILLIGRAM(S): at 10:35

## 2018-12-06 NOTE — PROGRESS NOTE ADULT - PROBLEM SELECTOR PLAN 1
- Pain well controlled, continue current pain regimen  - Increase ambulation, SCDs when not ambulating  - Continue regular diet  - Discharge planning     Connie Lazo PGY1

## 2018-12-06 NOTE — PROGRESS NOTE ADULT - ASSESSMENT
A/P: 27yo PPD#2 s/p  c/b EBL 1000 2/2 manual extraction of placenta.  Patient is stable, VSS. No signs or sxs of severe anemia. Hct stable

## 2018-12-06 NOTE — PROGRESS NOTE ADULT - SUBJECTIVE AND OBJECTIVE BOX
OB Progress Note:  PPD#2    S: 27yo PPD#2 s/p . Patient feels well. Pain is well controlled. She is tolerating a regular diet and passing flatus. She is voiding spontaneously, and ambulating without difficulty. Denies CP/SOB. Denies lightheadedness/dizziness. Denies N/V.    O:  Vitals:   Vital Signs Last 24 Hrs  T(C): 36.7 (06 Dec 2018 05:43), Max: 36.7 (05 Dec 2018 13:44)  T(F): 98 (06 Dec 2018 05:43), Max: 98.1 (05 Dec 2018 13:44)  HR: 65 (06 Dec 2018 05:43) (65 - 105)  BP: 90/54 (06 Dec 2018 05:43) (90/54 - 107/66)  BP(mean): --  RR: 18 (06 Dec 2018 05:43) (18 - 18)  SpO2: 99% (06 Dec 2018 05:43) (99% - 100%)    MEDICATIONS  (STANDING):  acetaminophen   Tablet .. 975 milliGRAM(s) Oral every 6 hours  ascorbic acid 500 milliGRAM(s) Oral daily  diphtheria/tetanus/pertussis (acellular) Vaccine (ADAcel) 0.5 milliLiter(s) IntraMuscular once  docusate sodium 100 milliGRAM(s) Oral two times a day  ferrous    sulfate 325 milliGRAM(s) Oral three times a day  ibuprofen  Tablet. 600 milliGRAM(s) Oral every 6 hours  oxyCODONE    IR 5 milliGRAM(s) Oral every 3 hours  prenatal multivitamin 1 Tablet(s) Oral daily  sodium chloride 0.9% lock flush 3 milliLiter(s) IV Push every 8 hours    MEDICATIONS  (PRN):  dibucaine 1% Ointment 1 Application(s) Topical every 4 hours PRN Perineal Discomfort  diphenhydrAMINE 25 milliGRAM(s) Oral every 6 hours PRN Itching  glycerin Suppository - Adult 1 Suppository(s) Rectal at bedtime PRN Constipation  hydrocortisone 1% Cream 1 Application(s) Topical every 4 hours PRN perineal discomfort  lanolin Ointment 1 Application(s) Topical every 6 hours PRN Sore Nipples  magnesium hydroxide Suspension 30 milliLiter(s) Oral two times a day PRN Constipation  oxyCODONE    IR 5 milliGRAM(s) Oral every 4 hours PRN Severe Pain (7 -10)  pramoxine 1%/zinc 5% Cream 1 Application(s) Topical every 4 hours PRN perineal discomfort  simethicone 80 milliGRAM(s) Chew every 6 hours PRN Gas  witch hazel Pads 1 Application(s) Topical every 4 hours PRN Perineal Discomfort      Labs:  Blood type: A Positive  Rubella IgG: RPR: Negative                          8.3<L>   7.66 >-----------< 133<L>    (  @ 06:00 )             25.4<L>                        9.0<L>   9.60 >-----------< 155    (  @ 14:00 )             26.9<L>                        8.3<L>   9.10 >-----------< 124<L>    (  @ 06:00 )             24.9<L>                        9.4<L>   14.34<H> >-----------< 138<L>    (  @ 12:00 )             27.4<L>                        3.6<LL>   4.54 >-----------< 63<L>    (  @ 11:57 )             11.2<LL>                        11.8   9.16 >-----------< 159    (  @ 23:05 )             35.6                        12.3   8.68 >-----------< 165    (  @ 09:10 )             36.6                  Physical Exam:  General: NAD  Abdomen: soft, non-tender, non-distended, fundus firm  Vaginal: Lochia wnl  Extremities: No erythema/edema

## 2019-06-24 NOTE — DISCHARGE NOTE OB - NSCORESITESY/N_GEN_A_CORE_RD
No Consent: The rationale for the repair was explained to the patient and consent was obtained. The risks, benefits and alternatives to therapy were discussed in detail. Specifically, the risks of infection, scarring, bleeding, prolonged wound healing, incomplete removal, allergy to anesthesia, nerve injury and recurrence were addressed. Prior to the procedure, the treatment site was clearly identified and confirmed by the patient. All components of Universal Protocol/PAUSE Rule completed.

## 2019-11-29 ENCOUNTER — TRANSCRIPTION ENCOUNTER (OUTPATIENT)
Age: 29
End: 2019-11-29

## 2020-01-18 NOTE — DISCHARGE NOTE OB - DO YOU HAVE DIFFICULTY CLIMBING STAIRS
REASON FOR VISIT  Follow-up for non-small cell lung cancer.    Cancer Staging  Non-small cell cancer of right lung (CMS/HCC)  Staging form: Lung, AJCC 8th Edition  - Clinical: Stage IV (cT1c, cN2, pM1a) - Signed by Eve Wynn MD on 8/5/2019    Prostate cancer (CMS/[facility])  Staging form: Prostate, AJCC 7th Edition  - Clinical: No stage assigned - Unsigned    Solid tumor mutation panel reported as  Result of variant analysis: DETECTED     1.          BRAF (Tier 1)  BRAF, V600E, Exon 15, p.Efq443Erm, c.1799T>A, [id number]      Genes tested:  AKT1, GNA11, KIT, PIK3CA, ERBB2, RET, FOXL2, MET, GNAQ, PDGFRA,     TP53, BRAF (including V600E), EGFR (deletions, insertions, T790M), NRAS (Exon     2,3,4), KRAS (Exon 2,3,4).       Lung cancer fusion panel reported as    Result of fusion analysis:  MET (Exon 14 Skipping) DETECTED       (Reference range:  Not Detected)       Genes tested:  ALK, ROS1, RET, NTRK1, NTRK3, FGFR1, FGFR2, FGFR3, PDGFRA, ABIGAIL,     NRGI and MET (exon 14 splice)       PD-L1 22 C3 testing reported as tumor proportion score of 10%.      HISTORY OF PRESENT ILLNESS    1. The patient is a 73 year old male with multiple comorbidities including history of prostate cancer diagnosed about 6 years ago at an outside facility. Patient reports that he has been on alpha reductase inhibitor along with PSA monitoring. Patient was in his usual state of health until November 2018 when he presented to primary care physician with complains of gross hematuria. The CT urogram was performed. CT urogram from 11/9/2018 showed right lower lobe pulmonary nodule abutting the pleura. Subsequent workup included a pulmonology referral and a PET/CT scan. PET/CT scan showed FDG avid right lower lobe pulmonary nodule along with mildly FDG avid right hilar/peribronchial as well as mediastinal lymph nodes. No FDG avid extrathoracic metastasis were noted. Patient underwent IR biopsy of right lower lobe pulmonary nodule which is reported  as moderately differentiated lung adenocarcinoma, CK 7 positive, CK 20 negative, TTF-1 positive, PSA negative, NKX3.1 negative.  2. Underwent mediastinoscopy and biopsy on 1/16/2019. Pathology was reported as station 7, station 4R biopsy positive for metastatic adenocarcinoma consistent with pulmonary primary.  3. S/p weekly carbotaxol chemotherapy concurrent with radiotherapy between 02/12/2019-04/09/2019.  4. Status post Robotic right video-assisted thoracoscopic surgery right lower lobectomy with lymph node dissections of stations 7, 4, 2 and 11 and ligation of thoracic duct 6/7/2019. Pathology reported as moderately differentiated adenocarcinoma similar predominant 2.5 cm with innumerable peritoneal nodule consistent lobe ranging from microscopic to 2 cm. Patient is status post neoadjuvant chemotherapy with minimal therapy-related response. Margins negative for carcinoma, positive for lymphatic invasion. 6 of 8 lymph nodes positive for metastatic carcinoma. ypT3  ypN2.  5. Patient was previously referred to [facility] for an opinion. He was evaluated by Dr. Wright who recommended no adjuvant therapy at this time given poor response to prior chemotherapy. Given pleural effusion, thoracentesis with fluid cytology was requested.  6. Fluid cytology from 7/11/2019 is reported as positive for metastatic adenocarcinoma pulmonary origin.  7. 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  8. Patient was initiated on palliative systemic therapy with dabrafenib in combination with trametinib on 10/03/2019.      Subjective:  Patient presents today for a follow-up visit, for monitoring of adverse effects from antineoplastic therapy and for continuation of the same.  His clinical course has been complicated by inpatient hospitalization for bronchopleural fistula, fever sepsis.   Antineoplastic therapy was stopped during hospitalization.    Patient denies any headaches, acute vision changes, focal neurological deficits, dizziness, lightheadedness, chest pain, complains of stable shortness of breath and dyspnea on exertion denies cough, hemoptysis.  Complains of paroxysmal nocturnal dyspnea.  Denies oral mucositis, abdominal pain, nausea, vomiting, diarrhea, constipation, melena, hematochezia, dysuria hematuria.  Denies fever, chills, drenching night sweats, unintentional weight loss, fatigue is grade 1. Mood is normal, sleep is normal, appetite is fair.  Reports that he is doing well overall.     PAST MEDICAL HISTORY:    Seasonal Allergies                                            Arthritis                                                     Impotence                                                     Hypogonadism male                                             Hematuria                                       11/2018       Wears reading glasses                                         Anxiety                                                       [place] (hard of hearing)-bilateral hearing aids                    Comment: Can hear w/o aids if loud enough volume    Right lower lobe lung mass                      11/2018       Prostate cancer (CMS/HCC)                       2008            Comment: Guillermina 6    Non-small cell cancer of right lung (CMS/HCC)   12/2018       S/P robotic right lower lobectomy of lung       6/7/2019      s/p thoracic duct ligation                      6/7/2019      Irritability and anger                                          Comment: Controlled     PAST SURGICAL HISTORY:    CYSTOURETHROSCOPY                               11/13/2018     PROSTATE W BIOPSY                            variable        Comment: X 3- Dr. Adams    CT GUIDED NEEDLE BIOPSY LUNG                    12/03/2018    BRONCHOSCOPY                                    12/21/2018    PULMONARY FUNCTION  TEST                         2019    MEDIASTINOSCOPY WITH LYMPH NODE BIOPSY          2019    SUBCUTANEOUS PORT INSERTION                     2019    LUNG LOBECTOMY                                  2019      Comment: Right VATS right lower lobectomy    BILATERAL VATS ABLATION                         2019    THORACOSCOPY SURG LOBECTOMY                     2019      Comment: Robotic Right lower lobectomy, lymph node                dissection, chyle duct closure    MEDIASTINOSCOPY WITH LYMPH NODE BIOPSY          2019    SUBCUTANEOUS PORT INSERTION                     2019    CHEST TUBE INSERTION                            2019      Comment: 2 chest tubes     FAMILY HISTORY:  Family History   Problem Relation Age of Onset   • Other Mother         scoliosis   • Cancer Father         Prostate cancer,  at 74   • Cancer Brother         prostate/rare blood cancer   • Other Brother          at 64   • Patient is unaware of any medical problems Son    • Patient is unaware of any medical problems Son    • Patient is unaware of any medical problems Son        SOCIAL HISTORY:  Social History     Tobacco Use   • Smoking status: Former Smoker     Packs/day: 0.25     Years: 4.00     Pack years: 1.00     Types: Cigarettes, Cigars     Last attempt to quit: 1971     Years since quittin.0   • Smokeless tobacco: Never Used   Substance Use Topics   • Alcohol use: Yes     Alcohol/week: 5.0 - 7.0 standard drinks     Types: 5 - 7 Standard drinks or equivalent per week     Frequency: 4 or more times a week     Drinks per session: 1 or 2     Binge frequency: Never     Comment: 6 times a week    • Drug use: No       REVIEW OF SYSTEMS  Jadyn Rothman RD  2020  2:54 PM  Sign when Signing Visit  Nutrition Services:  Writer missed patient while in clinic today for follow up. Noted patient's weight is stable. Spoke with Dr. Wynn and plan to restart oral chemotherapy at [facility]  doses, no surgery due to progression. Will call to follow up with patient next week.     Jayla Gagnon  2020  9:35 AM  Sign when Signing Visit  Lavelle Valenzuela is a 73 year old male here for  Chief Complaint   Patient presents with   • Lung Cancer     Denies latex allergy or sensitivity.    Medication verified, no changes.  PCP and Pharmacy verified.    Social History     Tobacco Use   Smoking Status Former Smoker   • Packs/day: 0.25   • Years: 4.00   • Pack years: 1.00   • Types: Cigarettes, Cigars   • Last attempt to quit:    • Years since quittin.0   Smokeless Tobacco Never Used     Advance Directives Filed: Yes    ECOG:      Height: No.  Ht Readings from Last 1 Encounters:   20 6' 1\" (1.854 m)     Weight:Yes, shoes on.  Wt Readings from Last 3 Encounters:   20 83.9 kg   20 84.2 kg   19 83 kg       BMI: There is no height or weight on file to calculate BMI.    REVIEW OF SYSTEMS  GENERAL:  Patient denies headache, fevers, chills, night sweats, change in appetite, weight loss, dizziness, but complains of: excessive fatigue and concern tube is not draining,   ALLERGIC/IMMUNOLOGIC: Verified allergies: Yes  EYES:  Patient denies significant visual difficulties, double vision, blurred vision  ENT/MOUTH: Patient denies problems with hearing, sore throat, sinus drainage, mouth sores  ENDOCRINE:  Patient denies diabetes, thyroid disease, hormone replacement, hot flashes  HEMATOLOGIC/LYMPHATIC: Patient denies easy bruising, bleeding, tender lymph nodes, swollen lymph nodes  BREASTS: Patient denies abnormal masses of breast, nipple discharge, pain  RESPIRATORY:  Patient denies lung pain with breathing, coughing up blood, shortness of breath, but complains of: cough  CARDIOVASCULAR:  Patient denies anginal chest pain, palpitations, shortness of breath when lying flat, peripheral edema  GASTROINTESTINAL: Patient denies abdominal pain , nausea, vomiting, diarrhea, GI bleeding,  constipation, change in bowel habits, heartburn, sensation of feeling full, difficulty swallowing  : Patient denies blood in the urine, burning with urination, frequency, urgency, hesitancy, incontinence  MUSCULOSKELETAL:  Patient denies joint pain, bone pain, joint swelling, redness, decreased range of motion  SKIN:  Patient denies chronic rashes, inflammation, ulcerations, skin changes, itching  NEUROLOGIC:  Patient denies loss of balance, areas of focal weakness, abnormal gait, sensory problems, numbness, tingling  PSYCHIATRIC: Patient denies insomnia, depression, anxiety    Patient verbally consents to leaving detailed phone message regarding today's visit.         A 14-point review of system was otherwise negative except for the problems described above.    MEDICATIONS:  Current Outpatient Medications   Medication Sig Dispense Refill   • promethazine-codeine (PHENERGAN WITH CODEINE) 6.25-10 MG/5ML syrup Take 5 mLs by mouth nightly as needed for Cough. 120 mL 0   • sertraline (ZOLOFT) 50 MG tablet Take one tablet daily 90 tablet 3   • Multiple Vitamins-Minerals (MENS 50+ MULTI VITAMIN/MIN PO)      • prochlorperazine (COMPAZINE) 10 MG tablet Take 1 tablet by mouth every 6 hours as needed for Nausea or Vomiting. 30 tablet 11   • penicillin v potassium (VEETID) 500 MG tablet Take 1 tablet by mouth every 6 hours. 120 tablet 4   • Acetaminophen (EXTRA STRENGTH ACETAMINOPHEN) 500 MG Cap Take 2 capsules by mouth as needed (pain). Dose 2 tablets ( = 1000 mg)     • levocetirizine (XYZAL) 5 MG tablet Take 5 mg by mouth at bedtime as needed for Allergies.     • dutasteride (AVODART) 0.5 MG capsule Take 0.5 mg by mouth daily.       No current facility-administered medications for this visit.        ALLERGIES:  ALLERGIES:   Allergen Reactions   • Grass Other (See Comments)      nasal congestion all summer   • Pollen Other (See Comments)     sinus Congestion in early spring.        OBJECTIVE:    Vitals:    Vitals:     01/16/20 0932   BP: 106/70   Pulse: 86   Resp: 16   Temp: 97.8 °F (36.6 °C)   TempSrc: Oral   SpO2: 98%   Weight: 83.3 kg   PainSc:  0       ECOG 1, KPS 80, pain scale 0.     General:  Appears stated age, no acute distress.  HEENT:  Pupils equally round, reactive to light with extraocular movements intact.  Anicteric sclerae, no mucositis.  Neck:  Supple.  No cervical or supraclavicular lymphadenopathy appreciated.    Lungs:  Normal vesicular breathing.  Bibasilar crackles.  Cardiovascular:  S1 + S2 + 0, regular rate and rhythm.  No rubs, murmurs or gallop.  Abdomen:  Soft, nontender, no hepatosplenomegaly appreciated.  Bowel sounds positive.  Extremities:  No cyanosis, clubbing or edema.   Lymphatics:  There is no cervical, supraclavicular, axillary or inguinal lymphadenopathy appreciated.   Skin:  No bruises or petechiae seen.  Neurologic:  Motor strength normal. Coordination normal. No tremor noted.  Psychiatric: Cooperative. Appropriate mood and affect. Normal judgment.          LABS:  Recent Labs   Lab 01/15/20  1215   WBC 7.5   RBC 4.23*   HGB 13.3   HCT 39.8   MCV 94.1      Absolute Neutrophil 5.6   Absolute Lymph 0.9*   Absolute Mono 0.9   Absolute Eos 0.1   Absolute Baso 0.0     Recent Labs   Lab 01/15/20  1215  11/23/19  0754   Glucose 96   < > 113*   Sodium 136   < > 134*   Potassium 3.9   < > 4.0   Chloride 99   < > 100   Carbon Dioxide 28   < > 30   BUN 18   < > 13   Creatinine 0.74   < > 0.76   CALCIUM 9.3   < > 8.5   MAGNESIUM  --   --  1.7   TOTAL PROTEIN 7.3   < >  --    Albumin 3.2*   < >  --    AST/SGOT 29   < >  --    ALK PHOSPHATASE 91   < >  --    ALT/SGPT 26   < >  --     < > = values in this interval not displayed.     Recent Labs   Lab 01/15/20  1215  11/22/19  0456   Anion Gap 13   < > 8*   GLOBULIN 4.1*   < >  --    LDH  --   --  345*   TOTAL BILIRUBIN 0.5   < >  --     < > = values in this interval not displayed.          DIAGNOSTIC STUDIES:  Xr Chest Ap Or Pa    Result Date:  11/25/2019  XR CHEST AP OR PA HISTORY: 72-year-old male, chest tubes in place COMPARISON: Chest radiograph 11/21/2019. TECHNIQUE:  Single, AP upright view of the chest. FINDINGS: Stable left IJ Mediport catheter with tip in the cavoatrial junction and right sided chest tubes. Stable appearance of a moderate right hydropneumothorax. The cardiomediastinal contour and pulmonary vasculature are within normal limits. Medial right lung atelectasis, similar to prior likely compressive. The visualized osseous structures demonstrate no acute abnormality.     IMPRESSION: Unchanged appearance of a moderate right hydropneumothorax. Right chest tubes are in unchanged position. I have personally reviewed the images and modified the resident's report as necessary.     Xr Chest Ap Or Pa    Result Date: 11/21/2019  EXAM: XR CHEST AP OR PA INDICATION: Fever. History of right lung non-small cell cancer with previous right lower lobectomy. Previous thoracic duct ligation. History of prostate cancer. Follow-up hydropneumothorax with chest tube in place. COMPARISON: Multiple previous studies with the most recent 9/11/2019 and a CT the chest from 9/23/2019. TECHNIQUE: AP view the chest obtained 11/21/2019 at 0532 hours. FINDINGS: Left internal jugular vein Mediport is in good position. There are 2 large bore right chest tubes in place. The previously noted pneumothorax in the right subpulmonic location is not seen. Fluid is noted in the subpulmonic space on the right with volume loss from the right lower lobectomy. Heart size and central vessels are normal. There is no other focal consolidation. There is no left pleural effusion or pneumothorax.     IMPRESSION: 1. 2 large bore right chest tubes remain in place. The previously noted subpulmonic pneumothorax has resolved with fluid filling the right pleural space. 2. No focal consolidation.     Xr Chest Pa And Lateral    Result Date: 12/4/2019  XR CHEST PA AND LATERAL HISTORY:   chest tube  check. COMPARISON: 11/21/2019, 11/25/2019. FINDINGS: Cardiac size is normal.   EKG leads overlie the chest . Power port catheter tip at the cavoatrial junction, unchanged. Tip of the chest tube projecting at the medial right mid chest similar to that seen previously. Shift of the mediastinum to the right, unchanged. No pneumothorax. Mildly blunted right lateral costophrenic angle. No focal infiltrate on either side.        IMPRESSION: Stable position of the chest tube projecting at the medial right mid chest. The other chest tube has been removed since 11/25/2019 Remainder of the chest is similar. Mildly blunted right lateral costophrenic angle, unchanged.     Ct Chest Pe Imaging    Result Date: 1/3/2020  CT ANGIOGRAM CHEST PE IMAGING- 3D HISTORY: Bronchopleural fistula, pulmonary embolism suspected Body of report: CT angiography the chest was performed by pulmonary embolism protocol with multiplanar and 3-D reconstructions provided for is essentially unchanged compared with 11/22/2019. Postsurgical changes are present in the right pulmonary hilum with some persistent perihilar opacity and bronchiectasis which are also stable. No mediastinal or hilar adenopathy or pleural or pericardial Interpretation. 140 cc of Isovue-370 was administered intravenously immediately prior to image acquisition. Adequate opacification of the pulmonary arteries with no evidence for pulmonary embolism or right ventricular strain. Intraventricular septum is intact. The thoracic aorta is of normal caliber with no dissection, ulceration, periaortic fluid. The thoracic inlet is intact. A pacemaker is in place on the left chest via the left subclavian vein. 2 chest tubes remain in place within the thick-walled cavity in the posterior right hemithorax. The cavity is unchanged in size and character compared with 11/22/2019. There are stable postsurgical changes and bronchiectasis in the right perihilar region. Persistent stable opacity in the  azygos region is unchanged compared with the prior study and possibly related to lymphadenopathy. The left lung is clear with the exception of mild left lung base atelectasis. Tiny stable persistent pericardial effusion. The adrenal glands are unremarkable in appearance. Degenerative changes in the dorsal spine.     IMPRESSION: No evidence for pulmonary embolism. Stable postsurgical changes in the right hemithorax in the right hilar region with possible enlarged lymph node in the azygos region of the mediastinum which is also stable. Air and fluid containing cavity posterior right hemithorax with 2 chest tubes in place is unchanged compared with 11/22/2019.        Ir Chest Tube And/ Or Drainage Catheter Placement For Ptx, Pleural Effusion    Result Date: 12/5/2019  CLINICAL HISTORY:  72 years-old Male presents with right pneumothorax. PROCEDURE PERFORMED: Chest tube placement. STAFF:  Mehreen Poe CONSENT:    The risks, benefits and alternatives to the procedure were explained to the patient and informed written consent was obtained. SEDATION:   Conscious moderate sedation was administered with continuous monitoring of the patient under direct supervision. Please refer to nursing flow sheet for doses of medications administered intravenously during the procedure. This was monitored independently by the interventional radiology nurse. Total sedation time in minutes was:   10 TECHNIQUE: The pneumothorax was identified by fluoroscopy. The right chest skin site for entrance of the chest tube was marked.  The site was prepped and draped in a sterile fashion.  After the infiltration of the skin and deep tissues with local anesthetic, a 19 -gauge needle was advanced into the pneumothorax using fluoroscopy guidance. After aspiration of air, a guidewire was advanced through the needle into the pneumothorax. Then after serial tract dilation, a 18-Turkmen Non-locking Oscar chest drainage tube was advanced over the  guidewire. The guidewire was removed and the drainage tube was connected to a Pleuravac and placed to wall suction at -20 mm water. The patient tolerated the procedure well with no immediate complications. This procedure was performed using ultrasound and fluoroscopy Fluoroscopy was used with a total time of 1.1 minutes.     IMPRESSION:  A chest drainage tube was placed as described above.     Pet Ct Fdg Skull Base To Mid-thighs    Result Date: 1/16/2020  PET CT FDG SKULL BASE TO MID-THIGHS HISTORY: Subsequent Treatment Strategy Evaluation. 73-year-old male with non-small cell lung adenocarcinoma of the right lower lobe with hilar and mediastinal lymphadenopathy status post chemotherapy and radiation. The patient subsequently underwent right lower lobectomy with lymph node dissection on 6/7/2019 which demonstrated pathology with minimal treatment effect with residual moderately differentiated adenocarcinoma positive lymph nodes. Pleural fluid cytology on 7/11/2019 was positive for metastatic adenocarcinoma of pulmonary origin. Patient then developed a bronchopleural fistula requiring surgical intervention on 8/20/2019 with talc pleurodesis. Patient was most recently on palliative systemic therapy with trametinib and dabrafenbi. COMPARISON: CT angiogram 1/3/2020. PET/CT 10/1/2019. RADIOPHARMACEUTICAL:  13.22 mCi  X-57-[id number]-4-Oeyyb-B-Glucose (FDG) IV via the right antecubital fossa at 1045. PROCEDURE: Immediately prior to radionuclide injection, blood glucose was measured and was 88 mg/dL. 60 min after radionuclide administration, PET and helical CT images of the body from the base of the skull to the proximal thighs were obtained and reconstructed in the axial, coronal and sagittal views. Fusion images and a maximal intensity projection (MIP) image were also generated. Images were reviewed and interpreted using Cel[employer] software. FINDINGS:  Head/Neck: No evidence for FDG avid metastatic disease in the soft tissues of  the head and neck. Physiologic uptake at the vocal cords. Chest: Extensive postsurgical changes in the chest. Diffuse increased uptake throughout the pleura is increased in extent and FDG avidity when compared to the previous examination. For example along the medial right upper lobe there is focal increased uptake with maximum SUV 7.9 (image 85).  Greater uptake is also seen along the inferior margin of the hydropneumothorax (image 112) involving the posterior and lateral pleura with maximum SUV 9.4, previously 8.6 though again increased in extent. New patchy airspace opacities in the left upper lobe (image 76) which are new from CT 1/3/2020 and most compatible with infectious/inflammatory etiology. Maximum SUV 5.3. Triangular nodular density in the right upper lobe (image 67) demonstrates maximum SUV 3.0. This was not definitively seen on the previous PET/CT though similar to recent CT 1/3/2020. Extensive patchy opacities in the left lung base are new from previous PET/CT though were present on recent diagnostic CT and may also be infectious or inflammatory. Attention on continued follow-up is recommended. Nodular density in the right upper lobe (image 90) measures 7 mm, previously 5 mm (image 98 on prior) straight questionable minimal uptake. Patchy opacities in the right middle lobe (images 101 and 102) are new from previous PET/CT and demonstrate mildly increased uptake. Nodular densities in the left lower lobe (image 110) were not definitively seen on recent diagnostic exam which would suggest though these are nodular in appearance, though they may be infectious or inflammatory. Nodular opacity in the right upper lobe (image 61) appears to be increasing when compared to previous examination. The right upper lobe pleural-based opacity (image 68) appears similar to prior though uptake is significantly increased in this region with maximum SUV 6.9, previously 4.2. Otherwise the perihilar opacities appear grossly  similar to prior. The right-sided hydropneumothorax has decreased in size from previous examination. A single right-sided chest tube is now noted entering from the lower lateral lung and terminating in the right upper pleura. Uptake along the course of the tube, predominantly at the entry site is suspected to be inflammatory. A new region of focal increased uptake anterior to this site (image 124) demonstrates maximum SUV 8.8. Previously there was a second chest tube entering in close proximity to this location must this may relate to inflammatory changes from removal of a chest tube. Clinical correlation is recommended. Additionally, a small suspected tract is seen within the right posterior chest wall (image [id number] with low-grade uptake. Interval development of increased uptake in the right hilum with maximum SUV 7.1, though it is uncertain if this is related to hilar lymphadenopathy or extension of pleural uptake. Interval development of increased uptake associated with 1 cm pericardial lymph node (image 101) with maximum SUV 3.9. Nonspecific uptake associated with the right atrial appendage changes low-grade nonfocal. Abdomen/Pelvis: No evidence for FDG avid metastatic disease in the soft tissues of the abdomen and pelvis. Tiny nonenlarged right inguinal lymph node is more prominent than on prior and with mildly increased uptake though is nonspecific and most likely reactive or inflammatory. Uptake projecting along the posterior margin of the bladder demonstrates a confirmation suggestive of artifact. Musculoskeletal: No evidence for FDG avid osseous metastatic disease. Increased uptake in the right antecubital fossa, compatible site of injection. Ancillary CT Findings: Left chest port with tip near the cavoatrial junction. Atherosclerotic calcification of the thoracic aorta without aneurysmal dilatation. Main pulmonary artery is normal in caliber. Coronary artery calcification. Trace pericardial fluid, similar to  prior. Moderate symmetric probable gynecomastia, also similar to previous examination. Postsurgical changes in the right lung as above with interval decrease in size of the right hydropneumothorax. Diverticulosis. Normal appendix. No evidence for bowel obstruction. Bladder is grossly normal in appearance. Stable sclerotic probable bone islands in the right iliac bone scattered degenerative changes in the spine.     IMPRESSION: 1. Significant interval increase in extent and FDG avidity of uptake throughout the pleura, which could be infectious/inflammatory, related to talc pleurodesis, or potentially related to worsening pleural disease. 2. Right upper lobe patchy airspace opacity extending to the right suprahilar region has mildly increased from previous examination though demonstrates significant interval increase in uptake. Findings again are indeterminate and could be infectious/inflammatory or related to talc pleurodesis given its proximity to the pleura though sites of disease progression cannot be excluded. 3. Mildly hypermetabolic pericardial lymph node, indeterminate but suspicious for sebas metastasis. Uptake in the right hilum is difficult to differentiate from pleural and adjacent parenchymal uptake. 4. Interval development of extensive hypermetabolic patchy opacities in the left lung and to a lesser extent the right upper lobe compared to CT 1/3/2020 most compatible with infectious/inflammatory etiology. A new triangular density in the right upper lobe was present on recent CT though is new from prior PET and could also be infectious/inflammatory though a small lesion cannot be excluded. Attention on short interval follow-up of these findings is recommended. 5. No evidence for FDG avid metastatic disease outside the chest. 6. Right hydropneumothorax decreased in size. Right chest tube demonstrates uptake along the cutaneous insertion site likely inflammatory. Cutaneous increased uptake anterior to the  site of chest tube insertion is near the site of previously demonstrated chest tube, possibly inflammation related to recent removal. Additional tiny tract with increased uptake is seen along the right posterior lateral chest wall. Clinical correlation of these findings is recommended.    Ct Chest Wo Contrast    Result Date: 11/22/2019  EXAM: CT CHEST WO CONTRAST     HISTORY:  From order:  Pneumonia, unresolved or complicated     COMPARISON:  Chest x-ray yesterday. PET CT October 1, 2019  FINDINGS:  The study was performed on 11/22/2019 5:05 PM Chest CT is done without contrast. There are two chest tubes in the lower right chest and there is a thick-walled air-filled cavity containing knees tubes in the posterior basilar right pleural space. Size of the pleural air cavity is smaller than prior CT chest. There is a minimal dependent fluid level. There are thoracotomy surgical changes in the right hilar region. There is some strandy density adjacent to the fissure. The appearance is similar to the prior PET/CT Left lung is clear with no infiltrate mass nodule effusion or pneumothorax. There are currently no visualized suspicious lung nodules. The heart size is normal. There is slight pericardial thickening or fluid. The esophagus is not dilated. There is ill-defined paratracheal density near the azygos region similar the prior PET/CT study. This likely is some adenopathy. There is no axillary adenopathy or mass. There is a infusion port over the upper left chest. No thyroid masses evident The upper abdomen structures are unremarkable with no mass or adenopathy finding There are hypertrophic spurring changes stable and the thoracic spine without fracture or destruction. The sternum appears intact.     IMPRESSION:  Two chest tubes remain within a thick-walled pleural air cavity in the posterior right chest, with slight decrease in size of the air cavity since comparison PET CT study in October. Stable right thoracotomy  changes. Vague density remains in the right suprahilar lung and in the right paratracheal space at the azygos node region, similar to prior PET/CT. Delineation is limited without intravascular contrast. Continued clear appearance left lung without infiltrate or mass.       ASSESSMENT:  1. Patient is a 72-year-old male with history of prostate cancer who presents for evaluation for non-small cell lung cancer adenocarcinoma right lower lobe.  2. Hilar and mediastinal lymphadenopathy, status post mediastinoscopy on 1/16/2019, pathology is positive for metastatic adenocarcinoma consistent with pulmonary primary.  3. S/p weekly carbotaxol chemotherapy concurrent with radiotherapy, dates as above.  4. Status post right lower lobectomy and lymph node dissection 6/7/2019 as above. Pathology reported as minimal treatment effect with residual moderately differentiated adenocarcinoma innumerable separate tumor nodules within the same lobe, 6 out of 8 positive metastatic lymph nodes. ypT3  ypN2  5. 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  6. 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  7. Actinomyces cultured out of pleural effusion. Patient is on antibiotic therapy for the same.  8. Initiated on palliative systemic therapy with trametinib in combination with dabrafenib on 10/03/2019.  Antineoplastic therapy was stopped during hospitalization 11/24/2019 for fever, sepsis in the setting of bronchopleural fistula.  9. Interval follow-up PET-CT scan from 01/15/2020 is concerning for disease progression.  10. Encounter for monitoring of antineoplastic chemotherapy.        Plan:  1. Discussed results of interval follow-up PET-CT scan from January 2019 with the patient in detail and reviewed images.  PET-CT scans concerning for disease progression with increased right pleural uptake  as well as uptake in the left lung.  We discussed about additional systemic therapy versus best supportive care with without transition to hospice.  Patient is opting to resume dabrafenib in combination with trametinib which will be dose reduced given grade 3 neutropenia noted during previous hospitalization 11/22/2019.  2. Continued management of Actinomyces infection by infectious disease services.  3. For bronchopleural fistula, management as per cardiothoracic surgery.  4. For previously noted thrombocytopenia, extensive workup was obtained. Patient is noted to have adequate vitamin B12, RBC folate, ceruloplasmin, PF 4 negative, Dennys TS 13 activity adequate, immature platelet fraction within reference range, platelet autoantibodies positive, DIC panel negative. Overall, thrombocytopenia seems to be a combination of myelosuppression from systemic therapy, platelet autoantibodies positive/immune thrombocytopenia. Thrombocytopenia has resolved based on labs from 6/26/2019, 7/10/2019.   5. For paroxysmal nocturnal dyspnea, transthoracic echocardiogram was obtained.  Echocardiogram from 01/07/2020 reported as here for 60%.  Discussed with the patient and family.  6. For prostate cancer, continued follow-up with urology at outside facility.  7. Recommend continued follow-up with primary care physician for chronic medical conditions and healthcare maintenance. Age-appropriate screening as per the primary care physician.    Summary/follow-up:  Resume antineoplastic therapy at reduced doses.  Obtain interval follow-up CBC diff, CMP in 2 and 4 weeks time.  Patient return to clinic in 4 weeks time for follow-up; return to clinic sooner if needed.  Goals of treatment are noncurative, discussed with the patient.    Patient indicates/verbalized understanding of the diagnosis and agrees with the above plan of care.  Patient was encouraged to contact me or the clinical nurse/ nurse navigator prior to the next appointment should  they have any questions and or concerns.      Thank you Justice Garrido MD  for letting me participate in the patient's care.  Please feel free to contact me with any questions and or concerns.               No